# Patient Record
Sex: FEMALE | Race: WHITE | Employment: OTHER | URBAN - METROPOLITAN AREA
[De-identification: names, ages, dates, MRNs, and addresses within clinical notes are randomized per-mention and may not be internally consistent; named-entity substitution may affect disease eponyms.]

---

## 2019-08-17 ENCOUNTER — HOSPITAL ENCOUNTER (EMERGENCY)
Age: 39
Discharge: HOME | End: 2019-08-17
Attending: INTERNAL MEDICINE
Payer: COMMERCIAL

## 2019-08-17 VITALS
DIASTOLIC BLOOD PRESSURE: 67 MMHG | RESPIRATION RATE: 18 BRPM | SYSTOLIC BLOOD PRESSURE: 106 MMHG | HEART RATE: 112 BPM | TEMPERATURE: 98.9 F | BODY MASS INDEX: 31.01 KG/M2 | OXYGEN SATURATION: 96 % | WEIGHT: 175 LBS | HEIGHT: 63 IN

## 2019-08-17 DIAGNOSIS — D17.1 LIPOMA OF BACK: Primary | ICD-10-CM

## 2019-08-17 PROCEDURE — 6360000002 HC RX W HCPCS: Performed by: INTERNAL MEDICINE

## 2019-08-17 PROCEDURE — 99282 EMERGENCY DEPT VISIT SF MDM: CPT

## 2019-08-17 PROCEDURE — 96372 THER/PROPH/DIAG INJ SC/IM: CPT

## 2019-08-17 RX ORDER — GABAPENTIN 100 MG/1
200 CAPSULE ORAL 3 TIMES DAILY
COMMUNITY

## 2019-08-17 RX ORDER — PREDNISONE 20 MG/1
40 TABLET ORAL DAILY
Qty: 10 TABLET | Refills: 0 | Status: SHIPPED | OUTPATIENT
Start: 2019-08-17 | End: 2019-08-22

## 2019-08-17 RX ORDER — PANTOPRAZOLE SODIUM 40 MG/1
40 TABLET, DELAYED RELEASE ORAL DAILY
COMMUNITY

## 2019-08-17 RX ORDER — KETOROLAC TROMETHAMINE 10 MG/1
10 TABLET, FILM COATED ORAL EVERY 6 HOURS PRN
Qty: 20 TABLET | Refills: 0 | Status: SHIPPED | OUTPATIENT
Start: 2019-08-17

## 2019-08-17 RX ORDER — HYDROXYZINE 50 MG/1
50 TABLET, FILM COATED ORAL 3 TIMES DAILY PRN
COMMUNITY

## 2019-08-17 RX ORDER — METHYLPREDNISOLONE SODIUM SUCCINATE 125 MG/2ML
80 INJECTION, POWDER, LYOPHILIZED, FOR SOLUTION INTRAMUSCULAR; INTRAVENOUS ONCE
Status: COMPLETED | OUTPATIENT
Start: 2019-08-17 | End: 2019-08-17

## 2019-08-17 RX ORDER — VENLAFAXINE 37.5 MG/1
37.5 TABLET ORAL DAILY
COMMUNITY

## 2019-08-17 RX ORDER — ARIPIPRAZOLE 5 MG/1
5 TABLET ORAL DAILY
COMMUNITY

## 2019-08-17 RX ADMIN — METHYLPREDNISOLONE SODIUM SUCCINATE 80 MG: 125 INJECTION, POWDER, FOR SOLUTION INTRAMUSCULAR; INTRAVENOUS at 19:47

## 2019-08-17 ASSESSMENT — PAIN SCALES - GENERAL: PAINLEVEL_OUTOF10: 8

## 2019-08-17 ASSESSMENT — PAIN DESCRIPTION - PAIN TYPE: TYPE: ACUTE PAIN

## 2019-08-17 ASSESSMENT — PAIN DESCRIPTION - DIRECTION: RADIATING_TOWARDS: DOWN BOTH LEGS

## 2019-08-17 ASSESSMENT — PAIN DESCRIPTION - ONSET: ONSET: ON-GOING

## 2019-08-17 ASSESSMENT — PAIN DESCRIPTION - ORIENTATION: ORIENTATION: RIGHT;LEFT;UPPER;LOWER

## 2019-08-17 ASSESSMENT — PAIN DESCRIPTION - LOCATION: LOCATION: BACK

## 2019-08-17 ASSESSMENT — PAIN DESCRIPTION - PROGRESSION: CLINICAL_PROGRESSION: GRADUALLY WORSENING

## 2019-08-17 ASSESSMENT — PAIN DESCRIPTION - DESCRIPTORS: DESCRIPTORS: PRESSURE;STABBING

## 2019-08-17 ASSESSMENT — ACTIVITIES OF DAILY LIVING (ADL): EFFECT OF PAIN ON DAILY ACTIVITIES: NOT SLEEPING

## 2019-08-17 ASSESSMENT — PAIN DESCRIPTION - FREQUENCY: FREQUENCY: CONTINUOUS

## 2019-08-17 NOTE — ED PROVIDER NOTES
[address] [facility] ED  EMERGENCY DEPARTMENT ENCOUNTER      Pt Name: Nancy Walsh  MRN: [id number]  [place] 1980  Date of evaluation: 8/17/2019  Provider: Wendy Hahn MD    CHIEF COMPLAINT       Chief Complaint   Patient presents with    Back Pain     for 2 months. Pt said she has painful lumps on her back for 2 months that are getting worse. HISTORY OF PRESENT ILLNESS   (Location/Symptom, Timing/Onset, Context/Setting, Quality, Duration, Modifying Factors, Severity)  Note limiting factors. Nancy Walsh is a 37 y.o. female who presents to the emergency department for evaluation and management of 2 months of back pain due to \"lumps on her back\". She states that they are painful when she lies down and presses on them a certatin way. She has not seen any other providers for them. She denies fever and weight loss. She hs tried ibuprofen but it is not helping. [facility]    Nursing Notes were reviewed. REVIEW OF SYSTEMS    (2-9 systems for level 4, 10 or more for level 5)       REVIEW OF SYSTEMS    Constitutional: Negative for fatigue and fever. Musculoskeletal: Positive for painful masses on her back, Negative for arthralgias, back pain and neck pain. Skin: Negative for color change, pallor, rash and wound. All other systems reviewed and are negative. Except as noted above theremainder of the review of systems was reviewed and negative. PASTMEDICAL HISTORY     Past Medical History:   Diagnosis Date    Anxiety     Depression          SURGICAL HISTORY     No past surgical history on file. CURRENT MEDICATIONS       Discharge Medication List as of 8/17/2019  7:51 PM      CONTINUE these medications which have NOT CHANGED    Details   gabapentin (NEURONTIN) 100 MG capsule Take 200 mg by mouth 3 times daily. Historical Med      ARIPiprazole (ABILIFY) 5 MG tablet Take 5 mg by mouth dailyHistorical Med      venlafaxine (EFFEXOR) 37.5 MG tablet Take 37.5 mg by mouth dehydration or loss of function. Patient Course:        ED Medicationsadministered this visit:    Medications   methylPREDNISolone sodium (SOLU-MEDROL) injection 80 mg (80 mg Intramuscular Given 8/17/19 1947)       New Prescriptions from this visit:    Discharge Medication List as of 8/17/2019  7:51 PM      START taking these medications    Details   predniSONE (DELTASONE) 20 MG tablet Take 2 tablets by mouth daily for 5 days, Disp-10 tablet, R-0Print      ketorolac (TORADOL) 10 MG tablet Take 1 tablet by mouth every 6 hours as needed for Pain Do not take with any other NSAIDs including ibuprofen, Motrin, Advil, Aleve, Naprosyn., Disp-20 tablet, R-0Print             Follow-up:  [facility] ED  [address] [facility] [address] [phone]    As needed, If symptoms worsen    [place] [address] [employer] [facility] [address]  Schedule an appointment as soon as possible for a visit in 3 days          Final Impression:   1. Lipoma of back Worsening              (Please note that portions of this note werecompleted with a voice recognition program.  Efforts were made to edit the dictations but occasionally words are mis-transcribed.)    FINAL IMPRESSION      1.  Lipoma of back Worsening         DISPOSITION/PLAN   DISPOSITION Decision To Discharge 08/17/2019 07:36:57 PM      PATIENT REFERRED TO:  [facility] ED  [address] [facility] [address]  [phone]    As needed, If symptoms worsen    [address] [facility] [phone]  Schedule an appointment as soon as possible for a visit in 3 days        DISCHARGE MEDICATIONS:  Discharge Medication List as of 8/17/2019  7:51 PM      START taking these medications    Details   predniSONE (DELTASONE) 20 MG tablet Take 2 tablets by mouth daily for 5 days, Disp-10 tablet, R-0Print      ketorolac (TORADOL) 10 MG tablet Take 1 tablet by mouth every 6 hours as needed for

## 2019-08-18 NOTE — PROGRESS NOTES
Discharge instructions reviewed with pt. Pt confirmed understanding with no further questions asked. Scripts given to pt. No further complaints voiced.
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
## 2023-10-11 ENCOUNTER — OFFICE VISIT (OUTPATIENT)
Dept: PAIN MEDICINE | Facility: CLINIC | Age: 43
End: 2023-10-11
Payer: COMMERCIAL

## 2023-10-11 VITALS
SYSTOLIC BLOOD PRESSURE: 118 MMHG | HEIGHT: 63 IN | DIASTOLIC BLOOD PRESSURE: 76 MMHG | HEART RATE: 92 BPM | BODY MASS INDEX: 37.57 KG/M2 | RESPIRATION RATE: 16 BRPM | WEIGHT: 212.07 LBS

## 2023-10-11 DIAGNOSIS — G89.29 CHRONIC BILATERAL LOW BACK PAIN WITHOUT SCIATICA: ICD-10-CM

## 2023-10-11 DIAGNOSIS — M54.50 CHRONIC BILATERAL LOW BACK PAIN WITHOUT SCIATICA: ICD-10-CM

## 2023-10-11 DIAGNOSIS — M70.61 GREATER TROCHANTERIC BURSITIS OF BOTH HIPS: ICD-10-CM

## 2023-10-11 DIAGNOSIS — M79.18 MYOFASCIAL PAIN: Primary | ICD-10-CM

## 2023-10-11 DIAGNOSIS — M70.62 GREATER TROCHANTERIC BURSITIS OF BOTH HIPS: ICD-10-CM

## 2023-10-11 PROCEDURE — 99214 OFFICE O/P EST MOD 30 MIN: CPT | Performed by: STUDENT IN AN ORGANIZED HEALTH CARE EDUCATION/TRAINING PROGRAM

## 2023-10-11 RX ORDER — GABAPENTIN 600 MG/1
600 TABLET ORAL 3 TIMES DAILY
COMMUNITY

## 2023-10-11 RX ORDER — TRAMADOL HYDROCHLORIDE 50 MG/1
50 TABLET ORAL 3 TIMES DAILY
COMMUNITY

## 2023-10-11 RX ORDER — LEVOTHYROXINE SODIUM 50 UG/1
50 TABLET ORAL
COMMUNITY

## 2023-10-11 RX ORDER — METAXALONE 800 MG/1
800 TABLET ORAL 3 TIMES DAILY
Qty: 90 TABLET | Refills: 1 | Status: SHIPPED | OUTPATIENT
Start: 2023-10-11 | End: 2023-10-30

## 2023-10-11 RX ORDER — METHOCARBAMOL 500 MG/1
500 TABLET, FILM COATED ORAL
COMMUNITY
End: 2023-10-11

## 2023-10-11 RX ORDER — METOPROLOL TARTRATE 50 MG/1
50 TABLET ORAL
COMMUNITY

## 2023-10-11 ASSESSMENT — PAIN - FUNCTIONAL ASSESSMENT: PAIN_FUNCTIONAL_ASSESSMENT: 0-10

## 2023-10-11 ASSESSMENT — PAIN SCALES - GENERAL
PAINLEVEL_OUTOF10: 8
PAINLEVEL: 8

## 2023-10-27 PROBLEM — F51.01 PRIMARY INSOMNIA: Status: ACTIVE | Noted: 2023-10-27

## 2023-10-27 PROBLEM — R06.00 DYSPNEA: Status: ACTIVE | Noted: 2023-10-27

## 2023-10-27 PROBLEM — M54.30 SCIATICA: Status: ACTIVE | Noted: 2023-10-27

## 2023-10-27 PROBLEM — D64.9 ANEMIA: Status: ACTIVE | Noted: 2023-10-27

## 2023-10-27 PROBLEM — M19.90 ARTHRITIS: Status: ACTIVE | Noted: 2023-10-27

## 2023-10-27 PROBLEM — N93.9 ABNORMAL VAGINAL BLEEDING: Status: ACTIVE | Noted: 2023-10-27

## 2023-10-27 PROBLEM — E53.9 VITAMIN B-COMPLEX DEFICIENCY: Status: ACTIVE | Noted: 2023-10-27

## 2023-10-27 PROBLEM — R92.8 ABNORMAL MAMMOGRAM: Status: ACTIVE | Noted: 2023-10-27

## 2023-10-27 PROBLEM — M79.7 FIBROMYALGIA: Status: ACTIVE | Noted: 2023-10-27

## 2023-10-27 PROBLEM — R20.2 NUMBNESS AND TINGLING: Status: ACTIVE | Noted: 2023-10-27

## 2023-10-27 PROBLEM — E78.00 HYPERCHOLESTEROLEMIA: Status: ACTIVE | Noted: 2023-10-27

## 2023-10-27 PROBLEM — R20.0 NUMBNESS AND TINGLING: Status: ACTIVE | Noted: 2023-10-27

## 2023-10-27 PROBLEM — C53.9 CERVICAL CANCER (MULTI): Status: ACTIVE | Noted: 2023-10-27

## 2023-10-27 PROBLEM — R49.9 VOICE DISTURBANCE: Status: ACTIVE | Noted: 2023-10-27

## 2023-10-27 PROBLEM — E03.9 HYPOTHYROIDISM: Status: ACTIVE | Noted: 2023-10-27

## 2023-10-27 PROBLEM — E78.2 MIXED HYPERLIPIDEMIA: Status: ACTIVE | Noted: 2023-10-27

## 2023-10-27 PROBLEM — K21.9 GASTROESOPHAGEAL REFLUX DISEASE: Status: ACTIVE | Noted: 2023-10-27

## 2023-10-27 PROBLEM — R31.0 CLOT HEMATURIA: Status: ACTIVE | Noted: 2023-10-27

## 2023-10-27 PROBLEM — K21.9 GASTRO-ESOPHAGEAL REFLUX DISEASE WITHOUT ESOPHAGITIS: Status: ACTIVE | Noted: 2023-10-27

## 2023-10-27 PROBLEM — E83.42 HYPOMAGNESEMIA: Status: ACTIVE | Noted: 2023-10-27

## 2023-10-27 PROBLEM — R29.810 FACIAL WEAKNESS: Status: ACTIVE | Noted: 2023-10-27

## 2023-10-27 PROBLEM — G89.4 CHRONIC PAIN SYNDROME: Status: ACTIVE | Noted: 2023-10-27

## 2023-10-27 PROBLEM — R21 RASH: Status: ACTIVE | Noted: 2023-10-27

## 2023-10-27 PROBLEM — M76.30 IT BAND SYNDROME: Status: ACTIVE | Noted: 2023-10-27

## 2023-10-27 PROBLEM — R91.1 SOLITARY PULMONARY NODULE: Status: ACTIVE | Noted: 2023-10-27

## 2023-10-27 PROBLEM — K58.9 IBS (IRRITABLE BOWEL SYNDROME): Status: ACTIVE | Noted: 2023-10-27

## 2023-10-27 PROBLEM — M62.838 OTHER MUSCLE SPASM: Status: ACTIVE | Noted: 2023-10-27

## 2023-10-27 PROBLEM — F41.8 MIXED ANXIETY DEPRESSIVE DISORDER: Status: ACTIVE | Noted: 2023-10-27

## 2023-10-27 PROBLEM — M51.9 LUMBAR DISC DISEASE: Status: ACTIVE | Noted: 2023-10-27

## 2023-10-27 PROBLEM — J98.01 BRONCHOSPASM: Status: ACTIVE | Noted: 2023-10-27

## 2023-10-27 PROBLEM — E53.8 VITAMIN B 12 DEFICIENCY: Status: ACTIVE | Noted: 2023-10-27

## 2023-10-27 PROBLEM — F41.9 ANXIETY: Status: ACTIVE | Noted: 2023-10-27

## 2023-10-27 PROBLEM — Z85.41 HISTORY OF CERVICAL CANCER: Status: ACTIVE | Noted: 2023-10-27

## 2023-10-27 PROBLEM — G93.32 CHRONIC FATIGUE SYNDROME: Status: ACTIVE | Noted: 2023-10-27

## 2023-10-27 PROBLEM — F90.9 ADHD (ATTENTION DEFICIT HYPERACTIVITY DISORDER): Status: ACTIVE | Noted: 2023-10-27

## 2023-10-27 PROBLEM — G62.9 PERIPHERAL NEUROPATHY: Status: ACTIVE | Noted: 2023-10-27

## 2023-10-27 PROBLEM — E78.5 DYSLIPIDEMIA: Status: ACTIVE | Noted: 2023-10-27

## 2023-10-27 PROBLEM — F31.9 BIPOLAR DISORDER (MULTI): Status: ACTIVE | Noted: 2023-10-27

## 2023-10-27 PROBLEM — M62.838 NECK MUSCLE SPASM: Status: ACTIVE | Noted: 2023-10-27

## 2023-10-27 PROBLEM — G43.909 MIGRAINE: Status: ACTIVE | Noted: 2023-10-27

## 2023-10-27 PROBLEM — G89.29 OTHER CHRONIC PAIN: Status: ACTIVE | Noted: 2023-10-27

## 2023-10-27 PROBLEM — B37.0 ORAL CANDIDIASIS: Status: ACTIVE | Noted: 2023-10-27

## 2023-10-27 PROBLEM — E55.9 VITAMIN D DEFICIENCY: Status: ACTIVE | Noted: 2023-10-27

## 2023-10-27 RX ORDER — FENOFIBRATE 134 MG/1
1 CAPSULE ORAL DAILY
COMMUNITY
Start: 2019-07-12 | End: 2023-10-30 | Stop reason: SDUPTHER

## 2023-10-27 RX ORDER — HYDROXYZINE HYDROCHLORIDE 50 MG/1
1 TABLET, FILM COATED ORAL NIGHTLY PRN
COMMUNITY
Start: 2019-07-12

## 2023-10-27 RX ORDER — PEN NEEDLE, DIABETIC 31 GX5/16"
NEEDLE, DISPOSABLE MISCELLANEOUS
COMMUNITY
Start: 2023-07-16

## 2023-10-27 RX ORDER — METOPROLOL SUCCINATE 50 MG/1
1 TABLET, EXTENDED RELEASE ORAL DAILY
COMMUNITY
End: 2023-10-30 | Stop reason: SDUPTHER

## 2023-10-27 RX ORDER — PREGABALIN 300 MG/1
CAPSULE ORAL
COMMUNITY
End: 2024-01-11 | Stop reason: WASHOUT

## 2023-10-27 RX ORDER — LANOLIN ALCOHOL/MO/W.PET/CERES
2 CREAM (GRAM) TOPICAL DAILY
COMMUNITY
End: 2023-10-30

## 2023-10-27 RX ORDER — DESVENLAFAXINE 100 MG/1
100 TABLET, EXTENDED RELEASE ORAL DAILY
COMMUNITY
End: 2023-10-30

## 2023-10-27 RX ORDER — LANCETS 33 GAUGE
EACH MISCELLANEOUS
COMMUNITY
Start: 2023-02-27 | End: 2023-10-30 | Stop reason: SDUPTHER

## 2023-10-27 RX ORDER — ALBUTEROL SULFATE 90 UG/1
2 AEROSOL, METERED RESPIRATORY (INHALATION) EVERY 6 HOURS
COMMUNITY
Start: 2023-10-02

## 2023-10-27 RX ORDER — CELECOXIB 100 MG/1
CAPSULE ORAL
COMMUNITY
End: 2023-10-30

## 2023-10-27 RX ORDER — CHOLECALCIFEROL (VITAMIN D3) 125 MCG
1 CAPSULE ORAL DAILY
COMMUNITY
Start: 2023-07-30 | End: 2023-10-30

## 2023-10-27 RX ORDER — IBUPROFEN 800 MG/1
TABLET ORAL
COMMUNITY
End: 2023-10-30

## 2023-10-27 RX ORDER — METOPROLOL SUCCINATE 25 MG/1
1 TABLET, EXTENDED RELEASE ORAL DAILY
COMMUNITY
Start: 2023-01-31

## 2023-10-27 RX ORDER — PROMETHAZINE HYDROCHLORIDE 25 MG/1
TABLET ORAL
COMMUNITY
End: 2023-10-30

## 2023-10-27 RX ORDER — LEVOTHYROXINE SODIUM 25 UG/1
25 CAPSULE ORAL
COMMUNITY
End: 2023-10-30

## 2023-10-27 RX ORDER — MIRTAZAPINE 15 MG/1
1 TABLET, FILM COATED ORAL DAILY
COMMUNITY
Start: 2019-07-12 | End: 2023-10-30

## 2023-10-27 RX ORDER — INSULIN ASPART 100 [IU]/ML
25 INJECTION, SOLUTION INTRAVENOUS; SUBCUTANEOUS 2 TIMES DAILY
COMMUNITY

## 2023-10-27 RX ORDER — HYDROCODONE BITARTRATE AND ACETAMINOPHEN 5; 325 MG/1; MG/1
TABLET ORAL
COMMUNITY
End: 2023-10-30 | Stop reason: ALTCHOICE

## 2023-10-27 RX ORDER — GLIMEPIRIDE 4 MG/1
1 TABLET ORAL 2 TIMES DAILY
COMMUNITY

## 2023-10-27 RX ORDER — INSULIN ASPART 100 [IU]/ML
50 INJECTION, SUSPENSION SUBCUTANEOUS 2 TIMES DAILY
COMMUNITY
Start: 2023-09-28

## 2023-10-27 RX ORDER — VENLAFAXINE HYDROCHLORIDE 150 MG/1
1 CAPSULE, EXTENDED RELEASE ORAL DAILY
COMMUNITY
End: 2023-10-30

## 2023-10-27 RX ORDER — LEVOFLOXACIN 500 MG/1
TABLET, FILM COATED ORAL
COMMUNITY
End: 2023-10-30 | Stop reason: ALTCHOICE

## 2023-10-27 RX ORDER — CLOTRIMAZOLE 10 MG/1
LOZENGE ORAL; TOPICAL 4 TIMES DAILY
COMMUNITY
Start: 2019-07-19 | End: 2023-10-30 | Stop reason: ALTCHOICE

## 2023-10-27 RX ORDER — PHENAZOPYRIDINE HYDROCHLORIDE 200 MG/1
1 TABLET, FILM COATED ORAL
COMMUNITY
Start: 2019-07-12 | End: 2023-10-30

## 2023-10-27 RX ORDER — BUSPIRONE HYDROCHLORIDE 10 MG/1
TABLET ORAL
COMMUNITY
End: 2023-10-30

## 2023-10-27 RX ORDER — GABAPENTIN 100 MG/1
2 CAPSULE ORAL 3 TIMES DAILY
COMMUNITY
Start: 2019-01-21 | End: 2023-10-30 | Stop reason: SDUPTHER

## 2023-10-27 RX ORDER — ONDANSETRON 4 MG/1
1 TABLET, ORALLY DISINTEGRATING ORAL DAILY
COMMUNITY
End: 2023-10-30 | Stop reason: SDUPTHER

## 2023-10-27 RX ORDER — ATORVASTATIN CALCIUM 80 MG/1
1 TABLET, FILM COATED ORAL NIGHTLY
COMMUNITY
Start: 2023-09-29 | End: 2023-10-30 | Stop reason: SDUPTHER

## 2023-10-27 RX ORDER — DICLOFENAC SODIUM 75 MG/1
TABLET, DELAYED RELEASE ORAL
COMMUNITY
End: 2023-10-30

## 2023-10-27 RX ORDER — TRIAMTERENE AND HYDROCHLOROTHIAZIDE 37.5; 25 MG/1; MG/1
CAPSULE ORAL
COMMUNITY
End: 2023-10-30

## 2023-10-27 RX ORDER — OXAPROZIN 600 MG/1
TABLET, FILM COATED ORAL
COMMUNITY
End: 2023-10-30

## 2023-10-27 RX ORDER — ESTRADIOL 1 MG/1
TABLET ORAL
COMMUNITY

## 2023-10-27 RX ORDER — BENZONATATE 100 MG/1
1 CAPSULE ORAL 3 TIMES DAILY PRN
COMMUNITY
Start: 2023-04-28 | End: 2023-10-30 | Stop reason: ALTCHOICE

## 2023-10-27 RX ORDER — ACETAMINOPHEN AND CODEINE PHOSPHATE 300; 30 MG/1; MG/1
TABLET ORAL
COMMUNITY
End: 2023-10-30

## 2023-10-27 RX ORDER — SUCRALFATE 1 G/10ML
10 SUSPENSION ORAL 4 TIMES DAILY
COMMUNITY
Start: 2023-08-04 | End: 2023-10-30 | Stop reason: SDUPTHER

## 2023-10-27 RX ORDER — LISDEXAMFETAMINE DIMESYLATE 70 MG/1
CAPSULE ORAL
COMMUNITY
End: 2023-10-30

## 2023-10-27 RX ORDER — BACLOFEN 10 MG/1
TABLET ORAL
COMMUNITY
End: 2023-10-30

## 2023-10-27 RX ORDER — ERGOCALCIFEROL 1.25 MG/1
CAPSULE ORAL
COMMUNITY
End: 2023-10-30 | Stop reason: SDUPTHER

## 2023-10-27 RX ORDER — DEXTROMETHORPHAN HYDROBROMIDE AND QUINIDINE SULFATE 20; 10 MG/1; MG/1
CAPSULE, GELATIN COATED ORAL
COMMUNITY
End: 2023-10-30

## 2023-10-27 RX ORDER — METRONIDAZOLE 500 MG/1
TABLET ORAL
COMMUNITY

## 2023-10-27 RX ORDER — ARIPIPRAZOLE 5 MG/1
1 TABLET ORAL DAILY
COMMUNITY
Start: 2019-07-12 | End: 2023-10-30

## 2023-10-27 RX ORDER — DICYCLOMINE HYDROCHLORIDE 20 MG/1
TABLET ORAL
COMMUNITY
End: 2023-10-30

## 2023-10-27 RX ORDER — METOCLOPRAMIDE 10 MG/1
TABLET ORAL
COMMUNITY
End: 2023-10-30 | Stop reason: ALTCHOICE

## 2023-10-27 RX ORDER — METHOCARBAMOL 500 MG/1
TABLET, FILM COATED ORAL
COMMUNITY
End: 2023-10-30 | Stop reason: SDUPTHER

## 2023-10-27 RX ORDER — GLIMEPIRIDE 2 MG/1
2 TABLET ORAL
COMMUNITY
End: 2023-10-30

## 2023-10-27 RX ORDER — PANTOPRAZOLE SODIUM 20 MG/1
TABLET, DELAYED RELEASE ORAL
COMMUNITY

## 2023-10-27 RX ORDER — HYDROCODONE BITARTRATE AND ACETAMINOPHEN 7.5; 325 MG/1; MG/1
TABLET ORAL
COMMUNITY
End: 2024-01-11 | Stop reason: WASHOUT

## 2023-10-27 RX ORDER — DIAZEPAM 10 MG/1
TABLET ORAL
COMMUNITY
End: 2023-10-30 | Stop reason: ALTCHOICE

## 2023-10-27 RX ORDER — PREGABALIN 150 MG/1
CAPSULE ORAL
COMMUNITY
End: 2023-10-30

## 2023-10-27 RX ORDER — MUPIROCIN 20 MG/G
OINTMENT TOPICAL
COMMUNITY
Start: 2019-11-20 | End: 2023-10-30 | Stop reason: ALTCHOICE

## 2023-10-27 RX ORDER — MELOXICAM 15 MG/1
TABLET ORAL
COMMUNITY
End: 2023-10-30

## 2023-10-27 RX ORDER — LURASIDONE HYDROCHLORIDE 60 MG/1
TABLET, FILM COATED ORAL
COMMUNITY
End: 2023-10-30

## 2023-10-27 RX ORDER — TENAPANOR HYDROCHLORIDE 53.2 MG/1
1 TABLET ORAL 2 TIMES DAILY
COMMUNITY
Start: 2023-08-29 | End: 2023-10-30

## 2023-10-27 RX ORDER — CIPROFLOXACIN 500 MG/1
1 TABLET ORAL 2 TIMES DAILY
COMMUNITY
Start: 2019-07-09 | End: 2023-10-30 | Stop reason: ALTCHOICE

## 2023-10-27 RX ORDER — CLONAZEPAM 0.5 MG/1
TABLET ORAL
COMMUNITY
End: 2023-10-30

## 2023-10-27 RX ORDER — VENLAFAXINE 75 MG/1
1 TABLET ORAL DAILY
COMMUNITY
Start: 2019-07-12 | End: 2023-10-30

## 2023-10-27 RX ORDER — CYANOCOBALAMIN (VITAMIN B-12) 500 MCG
TABLET ORAL EVERY 24 HOURS
COMMUNITY
End: 2023-10-30 | Stop reason: SDUPTHER

## 2023-10-27 RX ORDER — BLOOD SUGAR DIAGNOSTIC
STRIP MISCELLANEOUS
COMMUNITY
Start: 2023-09-28

## 2023-10-27 RX ORDER — HYDROXYZINE PAMOATE 50 MG/1
50 CAPSULE ORAL 3 TIMES DAILY PRN
COMMUNITY
End: 2023-10-30 | Stop reason: SDUPTHER

## 2023-10-27 RX ORDER — METHOCARBAMOL 750 MG/1
1 TABLET, FILM COATED ORAL 3 TIMES DAILY
COMMUNITY

## 2023-10-27 RX ORDER — PROPRANOLOL HYDROCHLORIDE 20 MG/1
1 TABLET ORAL EVERY 12 HOURS
COMMUNITY

## 2023-10-27 RX ORDER — LURASIDONE HYDROCHLORIDE 40 MG/1
TABLET, FILM COATED ORAL
COMMUNITY
End: 2023-10-30

## 2023-10-27 RX ORDER — SUCRALFATE 1 G/1
1 TABLET ORAL 3 TIMES DAILY
COMMUNITY
Start: 2023-09-28

## 2023-10-27 RX ORDER — ARIPIPRAZOLE 15 MG/1
TABLET ORAL
COMMUNITY
End: 2023-10-30 | Stop reason: ALTCHOICE

## 2023-10-27 RX ORDER — ZOLPIDEM TARTRATE 10 MG/1
TABLET ORAL
COMMUNITY
Start: 2019-03-12

## 2023-10-27 RX ORDER — ACETAMINOPHEN 500 MG
TABLET ORAL
COMMUNITY
End: 2023-10-30

## 2023-10-27 RX ORDER — FLUCONAZOLE 150 MG/1
TABLET ORAL
COMMUNITY
End: 2023-10-30 | Stop reason: ALTCHOICE

## 2023-10-27 RX ORDER — SULFAMETHOXAZOLE AND TRIMETHOPRIM 800; 160 MG/1; MG/1
TABLET ORAL
COMMUNITY
End: 2023-10-30 | Stop reason: ALTCHOICE

## 2023-10-27 RX ORDER — TIZANIDINE 4 MG/1
TABLET ORAL
COMMUNITY
End: 2023-10-30 | Stop reason: ALTCHOICE

## 2023-10-27 RX ORDER — ALPRAZOLAM 0.25 MG/1
TABLET ORAL
COMMUNITY
End: 2023-10-30

## 2023-10-27 RX ORDER — PREDNISONE 20 MG/1
TABLET ORAL
COMMUNITY
End: 2023-10-30 | Stop reason: ALTCHOICE

## 2023-10-27 RX ORDER — ONDANSETRON 4 MG/1
1 TABLET, FILM COATED ORAL EVERY 8 HOURS
COMMUNITY

## 2023-10-27 RX ORDER — PALIPERIDONE 9 MG/1
9 TABLET, EXTENDED RELEASE ORAL DAILY
COMMUNITY

## 2023-10-27 RX ORDER — MIRTAZAPINE 45 MG/1
TABLET, FILM COATED ORAL
COMMUNITY
End: 2023-10-30

## 2023-10-27 RX ORDER — HYDROCODONE BITARTRATE AND HOMATROPINE METHYLBROMIDE ORAL SOLUTION 5; 1.5 MG/5ML; MG/5ML
5 LIQUID ORAL 3 TIMES DAILY
COMMUNITY
Start: 2023-09-27 | End: 2023-10-30

## 2023-10-27 RX ORDER — DICYCLOMINE HYDROCHLORIDE 10 MG/1
1 CAPSULE ORAL 3 TIMES DAILY
COMMUNITY
Start: 2019-07-12 | End: 2023-10-30

## 2023-10-27 RX ORDER — FENOFIBRATE 145 MG/1
1 TABLET, FILM COATED ORAL DAILY
COMMUNITY
Start: 2023-08-31

## 2023-10-27 RX ORDER — CICLOPIROX OLAMINE 7.7 MG/G
1 CREAM TOPICAL 2 TIMES DAILY
COMMUNITY
Start: 2023-01-31

## 2023-10-27 RX ORDER — ATORVASTATIN CALCIUM 40 MG/1
TABLET, FILM COATED ORAL
COMMUNITY
Start: 2019-04-09

## 2023-10-27 RX ORDER — PANTOPRAZOLE SODIUM 40 MG/1
TABLET, DELAYED RELEASE ORAL
COMMUNITY
Start: 2018-11-30 | End: 2023-10-30 | Stop reason: SDUPTHER

## 2023-10-27 RX ORDER — BUTALBITAL, ACETAMINOPHEN AND CAFFEINE 50; 325; 40 MG/1; MG/1; MG/1
CAPSULE ORAL
COMMUNITY
End: 2023-10-30

## 2023-10-27 RX ORDER — LORATADINE 10 MG/1
TABLET ORAL
COMMUNITY
End: 2023-10-30

## 2023-10-27 RX ORDER — NAPROXEN 500 MG/1
500 TABLET ORAL
COMMUNITY
End: 2023-10-30

## 2023-10-27 RX ORDER — CLONIDINE HYDROCHLORIDE 0.1 MG/1
0.1 TABLET ORAL DAILY
COMMUNITY
End: 2023-10-30

## 2023-10-27 RX ORDER — OXYCODONE AND ACETAMINOPHEN 5; 325 MG/1; MG/1
TABLET ORAL
COMMUNITY
End: 2023-10-30 | Stop reason: ALTCHOICE

## 2023-10-27 RX ORDER — SITAGLIPTIN 100 MG/1
1 TABLET, FILM COATED ORAL DAILY
COMMUNITY

## 2023-10-27 RX ORDER — ACETAMINOPHEN 500 MG
TABLET ORAL
COMMUNITY

## 2023-10-30 ENCOUNTER — HOSPITAL ENCOUNTER (OUTPATIENT)
Dept: RADIOLOGY | Facility: HOSPITAL | Age: 43
Discharge: HOME | End: 2023-10-30
Payer: COMMERCIAL

## 2023-10-30 ENCOUNTER — HOSPITAL ENCOUNTER (OUTPATIENT)
Dept: GASTROENTEROLOGY | Facility: HOSPITAL | Age: 43
Discharge: HOME | End: 2023-10-30
Payer: COMMERCIAL

## 2023-10-30 VITALS
WEIGHT: 210 LBS | HEART RATE: 80 BPM | DIASTOLIC BLOOD PRESSURE: 78 MMHG | OXYGEN SATURATION: 94 % | RESPIRATION RATE: 18 BRPM | BODY MASS INDEX: 37.21 KG/M2 | SYSTOLIC BLOOD PRESSURE: 124 MMHG | TEMPERATURE: 97.2 F | HEIGHT: 63 IN

## 2023-10-30 LAB — GLUCOSE BLD MANUAL STRIP-MCNC: 171 MG/DL (ref 74–99)

## 2023-10-30 PROCEDURE — 20610 DRAIN/INJ JOINT/BURSA W/O US: CPT | Mod: 50 | Performed by: STUDENT IN AN ORGANIZED HEALTH CARE EDUCATION/TRAINING PROGRAM

## 2023-10-30 PROCEDURE — 76000 FLUOROSCOPY <1 HR PHYS/QHP: CPT

## 2023-10-30 PROCEDURE — 94760 N-INVAS EAR/PLS OXIMETRY 1: CPT

## 2023-10-30 PROCEDURE — 82947 ASSAY GLUCOSE BLOOD QUANT: CPT

## 2023-10-30 PROCEDURE — 20610 DRAIN/INJ JOINT/BURSA W/O US: CPT | Performed by: STUDENT IN AN ORGANIZED HEALTH CARE EDUCATION/TRAINING PROGRAM

## 2023-10-30 ASSESSMENT — PAIN SCALES - GENERAL
PAINLEVEL_OUTOF10: 7
PAINLEVEL_OUTOF10: 8

## 2023-10-30 ASSESSMENT — COLUMBIA-SUICIDE SEVERITY RATING SCALE - C-SSRS
1. IN THE PAST MONTH, HAVE YOU WISHED YOU WERE DEAD OR WISHED YOU COULD GO TO SLEEP AND NOT WAKE UP?: NO
6. HAVE YOU EVER DONE ANYTHING, STARTED TO DO ANYTHING, OR PREPARED TO DO ANYTHING TO END YOUR LIFE?: NO
2. HAVE YOU ACTUALLY HAD ANY THOUGHTS OF KILLING YOURSELF?: NO

## 2023-10-30 ASSESSMENT — PAIN DESCRIPTION - DESCRIPTORS: DESCRIPTORS: SHARP;ACHING;NUMBNESS;TINGLING

## 2023-10-30 ASSESSMENT — PAIN - FUNCTIONAL ASSESSMENT
PAIN_FUNCTIONAL_ASSESSMENT: 0-10
PAIN_FUNCTIONAL_ASSESSMENT: 0-10

## 2023-10-30 NOTE — OP NOTE
"Procedure Note: 10/30/2023    PROCEDURE NAME: Bilateral greater trochanteric bursa injection    Patient Information: Henrique Delcid, MRN: [id number], : 1980  Chief Complaint: Bilateral lateral hip pain    Pain Diagnosis: The diagnosis of greater trochanteric bursitis has been made given the patient's clinical evaluation at prior evaluation.    Vitals:Pulse 86   Temp 36.2 °C (97.2 °F) (Temporal)   Resp 16   Ht 1.6 m (5' 3\")   Wt 95.3 kg (210 lb)   SpO2 93%   BMI 37.20 kg/m²     DESCRIPTION OF PROCEDURE:    Henrique Delcid was brought to the procedure room. The assistant obtained vital signs, which were found to be stable. She was then informed of the procedure, its benefits, and its risks, and her questions were answered regarding the procedure. Written informed consent was obtained from the patient. Immediately prior to starting the procedure, a time-out safety check was conducted and the patient's identification, procedure name, and procedure site were confirmed with the patient.    Bilateral Greater Trochanteric Bursa Injection  After informed written consent was obtained, the patient was placed in the left lateral decubitus position on the fluoroscopy table. Monitoring of pulse oximetry, heart rate, and blood pressure was done pre-procedure, and post-procedure. During the procedure the patient was monitored with continuous pulse-ox. A time out was performed before the beginning of the procedure. The correct site and laterality were marked. The right hip was prepped and draped in sterile fashion using ChloraPrep and and allowed to dry for 3 minutes.     AP and lateral views were used to identify the right hip under fluoroscopy and the area overlying the great trochanter. A marker was used to identify the skin entry site just lateral to the greater trochanter. The skin and subcutaneous tissue was anesthetized using 3 mL of 1% plain lidocaine with 1.5-inch 25-gauge needle at this site. A 3.5-inch 25-gauge " spinal needle was slowly advanced towards the upper border of the greater trochanter. At a position with the tip of the needle just lateral to the trochanter, negative aspiration was confirmed and 0.5 mL of Omnipaque contrast dye was injected. A lenticular outline of contrast on fluoroscopy indicated proximity to the greater trochanteric bursa. A total volume of 3 ml of a mixture of 60 mg of triamcinolone (40 mg/mL) diluted in 4.5 mL of bupivacaine 0.25% was then injected. The needle was then removed    The patient was then turned to the right lateral decubitus position and the same procedure was performed on the left hip in a similar fashion.      Anesthesia: local anesthesia   Complications: none      Jasiel Mahajan MD

## 2023-10-30 NOTE — DISCHARGE INSTRUCTIONS
DISCHARGE INSTRUCTIONS FOR INJECTIONS     You underwent a bilateral greater trochanteric bursa injection today    Aftermost injections, it is recommended that you relax and limit your activity for the remainder of the day unless you have been told otherwise by your pain physician.  You should not drive a car, operate machinery, or make important legal decisions unless otherwise directed by your pain physician.  You may resume your normal activity, including exercise, tomorrow.      Keep a written pain diary of how much pain relief you experienced following the injection procedure and the length of time of pain relief you experienced pain relief. Following diagnostic injections like medial branch nerve blocks, sacroiliac joint blocks, stellate ganglion injections and other blocks, it is very important you record the specific amount of pain relief you experienced immediately after the injectionand how long it lasted. Your doctor will ask you for this information at your follow up visit.     For all injections, please keep the injection site dry and inspect the site for a couple of days. You may remove the Band-Aid the day of the injection at any time.     Some discomfort, bruising or slight swelling may occur at the injection site. This is not abnormal if it occurs.  If needed you may:    -Take over the counter medication such as Tylenol or Motrin.   -Apply an ice pack for 30 minutes, 2 to 3 times a day for the first 24 hours.     You may shower today; no soaking baths, hot tubs, whirlpools or swimming pools for two days.      If you are given steroids in your injection, it may take 3-5 days for the steroid medication to take effect. You may notice a worsening of your symptoms for 1-2 days after the injection. This is not abnormal.  You may use acetaminophen, ibuprofen, or prescription medication that your doctor may have prescribed for you if you need to do so.     A few common side effects of steroids include facial  flushing, sweating, restlessness, irritability,difficulty sleeping, increase in blood sugar, and increased blood pressure. If you have diabetes, please monitor your blood sugar at least once a day for at least 5 days. If you have poorly controlled high blood pressure, monitoryour blood pressure for at least 2 days and contact your primary care physician if these numbers are unusually high for you.      If you take aspirin or non-steroidal anti-inflammatory drugs (examples are Motrin, Advil, ibuprofen, Naprosyn, Voltaren, Relafen, etc.) you may restart these this evening, but stop taking it 3 days before your next appointment, unless instructed otherwiseby your physician.      You do not need to discontinue non-aspirin-containing pain medications prior to an injection (examples: Celebrex, tramadol, hydrocodone and acetaminophen).      If you take a blood thinning medication (Coumadin, Lovenox, Fragmin,Ticlid, Plavix, Pradaxa, etc.), please discuss this with your primary care physician/cardiologist and your pain physician. These medications MUST be discontinued before you can have an injection safely, without the risk of uncontrolled bleeding. If these medications are not discontinued for an appropriate period of time, you will not be able to receivean injection.      If you are taking Coumadin, please have your INR checked the morning of your procedure and bringthe result to your appointment unless otherwise instructed. If your INR is over 1.2, your injection may need to be rescheduled to avoid uncontrolled bleeding from the needle placement.     Call [facility] at [phone] between 8am-4pm Monday - Friday if you are experiencing the following:    If you received an epidural or spinal injection:    -Headache that doesnot go away with medicine, is worse when sitting or standing up, and is greatly relieved upon lying down.   -Severe pain worse than or different than your baseline pain.   -Chills  or fever (101º F or greater).   -Drainage or signs of infection at the injection site     Go directly to the Emergency Department if you are experiencing the following and received an epidural or spinal injection:   -Abrupt weakness or progressive weakness in your legs that starts after you leave the clinic.   -Abrupt severe or worsening numbness in your legs.   -Inability to urinate after the injection or loss of bowel or bladder control without the urge to defecate or urinate.     If you have a clinical question that cannot wait until your next appointment, please call [phone] between 8am-4pm Monday - Friday or send a [employer] [facility] message. We do our best to return all non-emergency messages within 24 hours, Monday - Friday. A nurse or physician will return your message.      If you need to cancel an appointment, please call the scheduling staff at [phone] during normal business hours or leave a message at least 24 hours in advance.     If you are going to be sedated for your next procedure, you MUST have responsible adult who can legally drive accompany you home. You cannot eat or drink for eight hours prior to the planned procedure if you are going to receive sedation. You may take your non-blood thinning medications with a small sip of water.

## 2023-10-30 NOTE — POST-PROCEDURE NOTE
Returns from procedure awake and conversing. Bed low & locked. Bandaids x 2 d/I. Visitor @ bedside.    0912-Ambulates without difficulty.    0915-Pt educated on discharge instructions.    0917-Ambulatory discharge with family.

## 2023-10-30 NOTE — Clinical Note
Right greater trochanter bursa injection complete. Bandaid applied to site. Patient assisted with repositioning to right lateral position.

## 2023-11-20 ENCOUNTER — HOSPITAL ENCOUNTER (EMERGENCY)
Facility: HOSPITAL | Age: 43
Discharge: HOME | End: 2023-11-20
Payer: COMMERCIAL

## 2023-11-20 ENCOUNTER — PHARMACY VISIT (OUTPATIENT)
Dept: PHARMACY | Facility: CLINIC | Age: 43
End: 2023-11-20
Payer: MEDICAID

## 2023-11-20 VITALS
SYSTOLIC BLOOD PRESSURE: 128 MMHG | OXYGEN SATURATION: 95 % | BODY MASS INDEX: 37.21 KG/M2 | DIASTOLIC BLOOD PRESSURE: 87 MMHG | HEIGHT: 63 IN | HEART RATE: 77 BPM | WEIGHT: 210 LBS | RESPIRATION RATE: 16 BRPM | TEMPERATURE: 98.2 F

## 2023-11-20 DIAGNOSIS — L03.039 CELLULITIS OF TOE, UNSPECIFIED LATERALITY: Primary | ICD-10-CM

## 2023-11-20 PROCEDURE — 99285 EMERGENCY DEPT VISIT HI MDM: CPT

## 2023-11-20 PROCEDURE — 99283 EMERGENCY DEPT VISIT LOW MDM: CPT

## 2023-11-20 RX ORDER — DOXYCYCLINE 100 MG/1
100 TABLET ORAL 2 TIMES DAILY
Qty: 20 TABLET | Refills: 0 | Status: SHIPPED | OUTPATIENT
Start: 2023-11-20 | End: 2023-11-30

## 2023-11-20 RX ORDER — SULFAMETHOXAZOLE AND TRIMETHOPRIM 800; 160 MG/1; MG/1
1 TABLET ORAL 2 TIMES DAILY
Qty: 20 TABLET | Refills: 0 | Status: SHIPPED | OUTPATIENT
Start: 2023-11-20 | End: 2023-11-30

## 2023-11-20 ASSESSMENT — PAIN - FUNCTIONAL ASSESSMENT: PAIN_FUNCTIONAL_ASSESSMENT: 0-10

## 2023-11-20 ASSESSMENT — PAIN SCALES - GENERAL: PAINLEVEL_OUTOF10: 7

## 2023-11-20 NOTE — ED PROVIDER NOTES
"HPI   Chief Complaint   Patient presents with    Earache     Left ear    Toe Pain     Bilateral great toe nail removal done on 11/13/2023 and \"they hurt, throbbing like I hit them with a hammer\"       History of present illness:  43-year-old female presents to the emergency room for complaints of bilateral great toe pain.  The patient states that earlier this week or late last leg she believes that she had new home toenails that were taken care of by her podiatrist.  She states that they removed the toenails and she states that she has been doing Epsom salt baths for the past 24 hours she has no swelling around the edges of the toenail beds bilaterally and she has noticed some purulent discharge.  She attempted to call the podiatrist office today but was told that they are out of the office until next week.  She states that she has no fevers or chills or any other symptoms at this time and confirms that she is a smoker as well as a type II diabetic.  She states her sugar has been running fine at this time.  She was not placed on antibiotics.  She states that she has also been having left ear pain that began this morning.  She denies any sinus congestion or sore throat or any other symptoms at this time.    Social history: Negative for alcohol and drug use.    Review of systems:   Gen.: No weight loss,  fever.   Eyes: No vision loss, double vision  ENT: No pharyngitis, neck pain  Cardiac: No chest pain, palpitations, syncope  Pulmonary: No shortness of breath, cough, hemoptysis.   Heme/lymph: No swollen glands, fever, bleeding.   GI: No abdominal pain, change in bowel habits, melena, hematemesis, hematochezia, nausea, vomiting, diarrhea.   : No discharge, dysuria, frequency, urgency, hematuria.   Musculoskeletal: No joint pain, joint swelling.   Skin: No rashes.   Review of systems is otherwise negative unless stated above or in history of present illness.      Physical exam:  General: Vitals noted, no distress. " Afebrile.   EENT: No lymphadenopathy appreciated, left tympanic membrane is unremarkable as is the right  Cardiac: Regular, rate, rhythm, no murmur.   Pulmonary: Lungs clear bilaterally with good aeration. No adventitious breath sounds.   Abdomen: Soft, nonsurgical. Nontender. No peritoneal signs. Normoactive bowel sounds.   Extremities: No peripheral edema.  The great toes bilaterally appear to have some minor cellulitis present at the edge of the nailbeds and there is some purulent discharge present as well, there is no paronychia present there is no cellulitis extending onto the toe itself otherwise and it does not appear to be present on the feet  Skin: No rash.   Neuro: No focal neurologic deficits        Medical decision making:   Testing: Clinical exam  Plan: Home-going.  Discussed differential. Will follow-up with the primary physician in the next 2-3 days. Return if worse. They understand return precautions and discharge instructions. Patient and family/friend/caregiver are in agreement with this plan. 43-year-old female presents to the emergency room for complaints of bilateral great toe pain.  The patient states that earlier this week or late last leg she believes that she had new home toenails that were taken care of by her podiatrist.  She states that they removed the toenails and she states that she has been doing Epsom salt baths for the past 24 hours she has no swelling around the edges of the toenail beds bilaterally and she has noticed some purulent discharge.  She attempted to call the podiatrist office today but was told that they are out of the office until next week.  She states that she has no fevers or chills or any other symptoms at this time and confirms that she is a smoker as well as a type II diabetic.  She states her sugar has been running fine at this time.  She was not placed on antibiotics.  She states that she has also been having left ear pain that began this morning.  She denies  any sinus congestion or sore throat or any other symptoms at this time. Extremities: No peripheral edema.  The great toes bilaterally appear to have some minor cellulitis present at the edge of the nailbeds and there is some purulent discharge present as well, there is no paronychia present there is no cellulitis extending onto the toe itself otherwise and it does not appear to be present on the feet. EENT: No lymphadenopathy appreciated, left tympanic membrane is unremarkable as is the right.  I explained to the patient be sending home on a short course of doxycycline this time and encouraged her to please return the event that she developed any worsening symptoms including a fever or further concerning signs of cellulitis.  The patient was agreeable this plan and asked advised her I did not see anything in her ear at this time as I believe she may be suffering from eustachian tube dysfunction.  I explained to her that she can take over-the-counter Nasacort or something else at this time to help alleviate her symptoms.  I strongly encouraged her to please follow-up closely with her podiatrist or to return event that she developed any further symptoms.  Impression:   1.  Cellulitis  2.  Eustachian tube dysfunction          History provided by:  Patient   used: No                        No data recorded                Patient History   Past Medical History:   Diagnosis Date    Anxiety disorder, unspecified     Anxiety    Edema, unspecified 09/01/2015    Swelling    Other conditions influencing health status 09/01/2015    Bulging disc    Other muscle spasm 09/01/2015    Trapezius muscle spasm    Personal history of diseases of the blood and blood-forming organs and certain disorders involving the immune mechanism     History of bleeding disorder    Personal history of malignant neoplasm of ovary 06/30/2015    History of ovarian cancer    Personal history of malignant neoplasm, unspecified      History of malignant neoplasm    Personal history of other diseases of the circulatory system     History of hypertension    Personal history of other diseases of the digestive system 12/12/2014    History of colitis    Personal history of other diseases of the digestive system     History of gastroesophageal reflux (GERD)    Personal history of other diseases of the respiratory system     History of bronchitis    Personal history of other diseases of the respiratory system     History of lung disease    Personal history of other diseases of urinary system     History of bladder problems    Personal history of other endocrine, nutritional and metabolic disease     History of hypercholesterolemia    Personal history of other endocrine, nutritional and metabolic disease     History of thyroid disorder    Personal history of other mental and behavioral disorders     History of depression    Personal history of other specified conditions 12/12/2014    History of edema    Personal history of other specified conditions     History of heartburn    Personal history of other specified conditions     History of shortness of breath    Post-traumatic stress disorder, unspecified     PTSD (post-traumatic stress disorder)     Past Surgical History:   Procedure Laterality Date    APPENDECTOMY  08/27/2014    Appendectomy    HYSTERECTOMY  08/27/2014    Hysterectomy    OTHER SURGICAL HISTORY  11/12/2018    Nose surgery    OTHER SURGICAL HISTORY  11/12/2018    Oral surgery    OTHER SURGICAL HISTORY  11/12/2018    Hand surgery     Family History   Problem Relation Name Age of Onset    Hyperlipidemia Mother      Hypertension Mother      Stroke Mother      Other (Fibromyalgia,hypertension, CVA) Mother      Heart block Mother      No Known Problems Father fam hx unk     Other (drug addiction) Brother       Social History     Tobacco Use    Smoking status: Every Day     Packs/day: .5     Types: Cigarettes    Smokeless tobacco: Never    Vaping Use    Vaping Use: Never used   Substance Use Topics    Alcohol use: Never    Drug use: Never       Physical Exam   ED Triage Vitals [11/20/23 1146]   Temp Heart Rate Resp BP   36.8 °C (98.2 °F) 77 16 128/87      SpO2 Temp Source Heart Rate Source Patient Position   95 % Oral -- --      BP Location FiO2 (%)     -- --       Physical Exam    ED Course & MDM   Diagnoses as of 11/20/23 1200   Cellulitis of toe, unspecified laterality       Medical Decision Making      Procedure  Procedures     Everardo Mariano PA-C  11/20/23 7841
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
## 2023-11-28 ENCOUNTER — APPOINTMENT (OUTPATIENT)
Dept: PAIN MEDICINE | Facility: CLINIC | Age: 43
End: 2023-11-28
Payer: COMMERCIAL

## 2024-01-11 ENCOUNTER — OFFICE VISIT (OUTPATIENT)
Dept: PAIN MEDICINE | Facility: CLINIC | Age: 44
End: 2024-01-11
Payer: COMMERCIAL

## 2024-01-11 VITALS
HEIGHT: 63 IN | BODY MASS INDEX: 36.32 KG/M2 | HEART RATE: 84 BPM | DIASTOLIC BLOOD PRESSURE: 60 MMHG | WEIGHT: 205 LBS | SYSTOLIC BLOOD PRESSURE: 106 MMHG | RESPIRATION RATE: 18 BRPM

## 2024-01-11 DIAGNOSIS — G89.29 CHRONIC BILATERAL LOW BACK PAIN WITHOUT SCIATICA: ICD-10-CM

## 2024-01-11 DIAGNOSIS — M79.18 MYOFASCIAL PAIN: Primary | ICD-10-CM

## 2024-01-11 DIAGNOSIS — M54.50 CHRONIC BILATERAL LOW BACK PAIN WITHOUT SCIATICA: ICD-10-CM

## 2024-01-11 DIAGNOSIS — M70.62 GREATER TROCHANTERIC BURSITIS OF BOTH HIPS: ICD-10-CM

## 2024-01-11 DIAGNOSIS — M70.61 GREATER TROCHANTERIC BURSITIS OF BOTH HIPS: ICD-10-CM

## 2024-01-11 PROCEDURE — 99214 OFFICE O/P EST MOD 30 MIN: CPT | Performed by: STUDENT IN AN ORGANIZED HEALTH CARE EDUCATION/TRAINING PROGRAM

## 2024-01-11 ASSESSMENT — PAIN SCALES - GENERAL
PAINLEVEL_OUTOF10: 2
PAINLEVEL: 2

## 2024-01-11 ASSESSMENT — PAIN - FUNCTIONAL ASSESSMENT: PAIN_FUNCTIONAL_ASSESSMENT: 0-10

## 2024-01-11 ASSESSMENT — PATIENT HEALTH QUESTIONNAIRE - PHQ9
2. FEELING DOWN, DEPRESSED OR HOPELESS: NEARLY EVERY DAY
4. FEELING TIRED OR HAVING LITTLE ENERGY: NEARLY EVERY DAY
10. IF YOU CHECKED OFF ANY PROBLEMS, HOW DIFFICULT HAVE THESE PROBLEMS MADE IT FOR YOU TO DO YOUR WORK, TAKE CARE OF THINGS AT HOME, OR GET ALONG WITH OTHER PEOPLE: EXTREMELY DIFFICULT
5. POOR APPETITE OR OVEREATING: SEVERAL DAYS
1. LITTLE INTEREST OR PLEASURE IN DOING THINGS: NEARLY EVERY DAY
SUM OF ALL RESPONSES TO PHQ QUESTIONS 1-9: 22
8. MOVING OR SPEAKING SO SLOWLY THAT OTHER PEOPLE COULD HAVE NOTICED. OR THE OPPOSITE, BEING SO FIGETY OR RESTLESS THAT YOU HAVE BEEN MOVING AROUND A LOT MORE THAN USUAL: NEARLY EVERY DAY
7. TROUBLE CONCENTRATING ON THINGS, SUCH AS READING THE NEWSPAPER OR WATCHING TELEVISION: NEARLY EVERY DAY
SUM OF ALL RESPONSES TO PHQ9 QUESTIONS 1 AND 2: 6
6. FEELING BAD ABOUT YOURSELF - OR THAT YOU ARE A FAILURE OR HAVE LET YOURSELF OR YOUR FAMILY DOWN: NEARLY EVERY DAY
9. THOUGHTS THAT YOU WOULD BE BETTER OFF DEAD, OR OF HURTING YOURSELF: NOT AT ALL
3. TROUBLE FALLING OR STAYING ASLEEP OR SLEEPING TOO MUCH: NEARLY EVERY DAY

## 2024-01-11 ASSESSMENT — PAIN DESCRIPTION - DESCRIPTORS: DESCRIPTORS: BURNING;SHOOTING
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
## 2024-01-16 ENCOUNTER — TELEPHONE (OUTPATIENT)
Dept: PAIN MEDICINE | Facility: CLINIC | Age: 44
End: 2024-01-16
Payer: COMMERCIAL

## 2024-01-16 NOTE — TELEPHONE ENCOUNTER
Left message for the patient to call and schedule appt for trigger point injection only. Patient was just here last week for a follow up.

## 2024-01-22 ENCOUNTER — TELEPHONE (OUTPATIENT)
Dept: PAIN MEDICINE | Facility: CLINIC | Age: 44
End: 2024-01-22
Payer: COMMERCIAL

## 2024-01-30 DIAGNOSIS — M79.18 MYOFASCIAL PAIN: Primary | ICD-10-CM

## 2024-02-01 ENCOUNTER — APPOINTMENT (OUTPATIENT)
Dept: PAIN MEDICINE | Facility: CLINIC | Age: 44
End: 2024-02-01
Payer: COMMERCIAL
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
## 2024-05-03 ENCOUNTER — HOSPITAL ENCOUNTER (OUTPATIENT)
Dept: RADIOLOGY | Facility: HOSPITAL | Age: 44
Discharge: HOME | End: 2024-05-03
Payer: COMMERCIAL

## 2024-05-03 VITALS — BODY MASS INDEX: 36.32 KG/M2 | WEIGHT: 205 LBS | HEIGHT: 63 IN

## 2024-05-03 DIAGNOSIS — Z12.31 ENCOUNTER FOR SCREENING MAMMOGRAM FOR MALIGNANT NEOPLASM OF BREAST: ICD-10-CM

## 2024-05-03 PROCEDURE — 77067 SCR MAMMO BI INCL CAD: CPT | Performed by: RADIOLOGY

## 2024-05-03 PROCEDURE — 77067 SCR MAMMO BI INCL CAD: CPT

## 2024-05-03 PROCEDURE — 77063 BREAST TOMOSYNTHESIS BI: CPT | Performed by: RADIOLOGY

## 2024-07-02 ENCOUNTER — APPOINTMENT (OUTPATIENT)
Dept: NEUROLOGY | Facility: CLINIC | Age: 44
End: 2024-07-02
Payer: COMMERCIAL

## 2024-10-03 ENCOUNTER — HOSPITAL ENCOUNTER (EMERGENCY)
Facility: HOSPITAL | Age: 44
Discharge: HOME | End: 2024-10-03
Payer: COMMERCIAL

## 2024-10-03 ENCOUNTER — APPOINTMENT (OUTPATIENT)
Dept: RADIOLOGY | Facility: HOSPITAL | Age: 44
End: 2024-10-03
Payer: COMMERCIAL

## 2024-10-03 VITALS
WEIGHT: 207.23 LBS | TEMPERATURE: 96.4 F | HEIGHT: 63 IN | BODY MASS INDEX: 36.72 KG/M2 | DIASTOLIC BLOOD PRESSURE: 74 MMHG | RESPIRATION RATE: 18 BRPM | HEART RATE: 84 BPM | OXYGEN SATURATION: 96 % | SYSTOLIC BLOOD PRESSURE: 127 MMHG

## 2024-10-03 DIAGNOSIS — R51.9 ACUTE NONINTRACTABLE HEADACHE, UNSPECIFIED HEADACHE TYPE: Primary | ICD-10-CM

## 2024-10-03 LAB
ALBUMIN SERPL BCP-MCNC: 4.4 G/DL (ref 3.4–5)
ALP SERPL-CCNC: 88 U/L (ref 33–110)
ALT SERPL W P-5'-P-CCNC: 33 U/L (ref 7–45)
ANION GAP SERPL CALCULATED.3IONS-SCNC: 14 MMOL/L (ref 10–20)
APPEARANCE UR: CLEAR
AST SERPL W P-5'-P-CCNC: 26 U/L (ref 9–39)
BASOPHILS # BLD AUTO: 0.07 X10*3/UL (ref 0–0.1)
BASOPHILS NFR BLD AUTO: 0.7 %
BILIRUB SERPL-MCNC: 0.3 MG/DL (ref 0–1.2)
BILIRUB UR STRIP.AUTO-MCNC: NEGATIVE MG/DL
BUN SERPL-MCNC: 11 MG/DL (ref 6–23)
CALCIUM SERPL-MCNC: 9.8 MG/DL (ref 8.6–10.3)
CHLORIDE SERPL-SCNC: 99 MMOL/L (ref 98–107)
CO2 SERPL-SCNC: 26 MMOL/L (ref 21–32)
COLOR UR: YELLOW
CREAT SERPL-MCNC: 0.5 MG/DL (ref 0.5–1.05)
EGFRCR SERPLBLD CKD-EPI 2021: >90 ML/MIN/1.73M*2
EOSINOPHIL # BLD AUTO: 0.22 X10*3/UL (ref 0–0.7)
EOSINOPHIL NFR BLD AUTO: 2.1 %
ERYTHROCYTE [DISTWIDTH] IN BLOOD BY AUTOMATED COUNT: 12.8 % (ref 11.5–14.5)
GLUCOSE SERPL-MCNC: 245 MG/DL (ref 74–99)
GLUCOSE UR STRIP.AUTO-MCNC: ABNORMAL MG/DL
HCT VFR BLD AUTO: 39.9 % (ref 36–46)
HGB BLD-MCNC: 13.6 G/DL (ref 12–16)
HYALINE CASTS #/AREA URNS AUTO: ABNORMAL /LPF
IMM GRANULOCYTES # BLD AUTO: 0.06 X10*3/UL (ref 0–0.7)
IMM GRANULOCYTES NFR BLD AUTO: 0.6 % (ref 0–0.9)
KETONES UR STRIP.AUTO-MCNC: NEGATIVE MG/DL
LEUKOCYTE ESTERASE UR QL STRIP.AUTO: NEGATIVE
LYMPHOCYTES # BLD AUTO: 4.45 X10*3/UL (ref 1.2–4.8)
LYMPHOCYTES NFR BLD AUTO: 42.4 %
MAGNESIUM SERPL-MCNC: 1.33 MG/DL (ref 1.6–2.4)
MCH RBC QN AUTO: 28.3 PG (ref 26–34)
MCHC RBC AUTO-ENTMCNC: 34.1 G/DL (ref 32–36)
MCV RBC AUTO: 83 FL (ref 80–100)
MONOCYTES # BLD AUTO: 0.59 X10*3/UL (ref 0.1–1)
MONOCYTES NFR BLD AUTO: 5.6 %
MUCOUS THREADS #/AREA URNS AUTO: ABNORMAL /LPF
NEUTROPHILS # BLD AUTO: 5.11 X10*3/UL (ref 1.2–7.7)
NEUTROPHILS NFR BLD AUTO: 48.6 %
NITRITE UR QL STRIP.AUTO: NEGATIVE
NRBC BLD-RTO: 0 /100 WBCS (ref 0–0)
PH UR STRIP.AUTO: 6 [PH]
PLATELET # BLD AUTO: 247 X10*3/UL (ref 150–450)
POTASSIUM SERPL-SCNC: 3.6 MMOL/L (ref 3.5–5.3)
PROT SERPL-MCNC: 7.2 G/DL (ref 6.4–8.2)
PROT UR STRIP.AUTO-MCNC: ABNORMAL MG/DL
RBC # BLD AUTO: 4.8 X10*6/UL (ref 4–5.2)
RBC # UR STRIP.AUTO: NEGATIVE /UL
RBC #/AREA URNS AUTO: ABNORMAL /HPF
SODIUM SERPL-SCNC: 135 MMOL/L (ref 136–145)
SP GR UR STRIP.AUTO: 1.03
SQUAMOUS #/AREA URNS AUTO: ABNORMAL /HPF
UROBILINOGEN UR STRIP.AUTO-MCNC: NORMAL MG/DL
WBC # BLD AUTO: 10.5 X10*3/UL (ref 4.4–11.3)
WBC #/AREA URNS AUTO: ABNORMAL /HPF

## 2024-10-03 PROCEDURE — 80053 COMPREHEN METABOLIC PANEL: CPT

## 2024-10-03 PROCEDURE — 99284 EMERGENCY DEPT VISIT MOD MDM: CPT | Mod: 25

## 2024-10-03 PROCEDURE — 96375 TX/PRO/DX INJ NEW DRUG ADDON: CPT

## 2024-10-03 PROCEDURE — 83735 ASSAY OF MAGNESIUM: CPT

## 2024-10-03 PROCEDURE — 85025 COMPLETE CBC W/AUTO DIFF WBC: CPT

## 2024-10-03 PROCEDURE — 70450 CT HEAD/BRAIN W/O DYE: CPT

## 2024-10-03 PROCEDURE — 36415 COLL VENOUS BLD VENIPUNCTURE: CPT

## 2024-10-03 PROCEDURE — 96365 THER/PROPH/DIAG IV INF INIT: CPT

## 2024-10-03 PROCEDURE — 81001 URINALYSIS AUTO W/SCOPE: CPT

## 2024-10-03 PROCEDURE — 2500000004 HC RX 250 GENERAL PHARMACY W/ HCPCS (ALT 636 FOR OP/ED)

## 2024-10-03 PROCEDURE — 70450 CT HEAD/BRAIN W/O DYE: CPT | Performed by: RADIOLOGY

## 2024-10-03 RX ORDER — PROCHLORPERAZINE EDISYLATE 5 MG/ML
10 INJECTION INTRAMUSCULAR; INTRAVENOUS ONCE
Status: COMPLETED | OUTPATIENT
Start: 2024-10-03 | End: 2024-10-03

## 2024-10-03 RX ORDER — DIPHENHYDRAMINE HYDROCHLORIDE 50 MG/ML
25 INJECTION INTRAMUSCULAR; INTRAVENOUS ONCE
Status: COMPLETED | OUTPATIENT
Start: 2024-10-03 | End: 2024-10-03

## 2024-10-03 RX ORDER — ACETAMINOPHEN 10 MG/ML
1000 INJECTION, SOLUTION INTRAVENOUS ONCE
Status: COMPLETED | OUTPATIENT
Start: 2024-10-03 | End: 2024-10-03

## 2024-10-03 RX ORDER — DEXAMETHASONE SODIUM PHOSPHATE 10 MG/ML
10 INJECTION INTRAMUSCULAR; INTRAVENOUS ONCE
Status: COMPLETED | OUTPATIENT
Start: 2024-10-03 | End: 2024-10-03

## 2024-10-03 RX ORDER — LANOLIN ALCOHOL/MO/W.PET/CERES
800 CREAM (GRAM) TOPICAL ONCE
Status: COMPLETED | OUTPATIENT
Start: 2024-10-03 | End: 2024-10-03

## 2024-10-03 RX ORDER — KETOROLAC TROMETHAMINE 30 MG/ML
15 INJECTION, SOLUTION INTRAMUSCULAR; INTRAVENOUS ONCE
Status: COMPLETED | OUTPATIENT
Start: 2024-10-03 | End: 2024-10-03

## 2024-10-03 RX ADMIN — DEXAMETHASONE SODIUM PHOSPHATE 10 MG: 10 INJECTION INTRAMUSCULAR; INTRAVENOUS at 18:28

## 2024-10-03 RX ADMIN — Medication 800 MG: at 18:28

## 2024-10-03 RX ADMIN — KETOROLAC TROMETHAMINE 15 MG: 30 INJECTION, SOLUTION INTRAMUSCULAR at 18:28

## 2024-10-03 RX ADMIN — DIPHENHYDRAMINE HYDROCHLORIDE 25 MG: 50 INJECTION, SOLUTION INTRAMUSCULAR; INTRAVENOUS at 16:07

## 2024-10-03 RX ADMIN — ACETAMINOPHEN 1000 MG: 10 INJECTION INTRAVENOUS at 16:07

## 2024-10-03 RX ADMIN — SODIUM CHLORIDE 500 ML: 900 INJECTION, SOLUTION INTRAVENOUS at 16:07

## 2024-10-03 RX ADMIN — PROCHLORPERAZINE EDISYLATE 10 MG: 5 INJECTION INTRAMUSCULAR; INTRAVENOUS at 16:07

## 2024-10-03 ASSESSMENT — PAIN DESCRIPTION - ONSET: ONSET: SUDDEN

## 2024-10-03 ASSESSMENT — PAIN DESCRIPTION - ORIENTATION
ORIENTATION: LEFT
ORIENTATION: LEFT

## 2024-10-03 ASSESSMENT — PAIN DESCRIPTION - FREQUENCY: FREQUENCY: CONSTANT/CONTINUOUS

## 2024-10-03 ASSESSMENT — PAIN DESCRIPTION - DESCRIPTORS
DESCRIPTORS: ACHING
DESCRIPTORS: PRESSURE;SHARP;STABBING

## 2024-10-03 ASSESSMENT — COLUMBIA-SUICIDE SEVERITY RATING SCALE - C-SSRS
6. HAVE YOU EVER DONE ANYTHING, STARTED TO DO ANYTHING, OR PREPARED TO DO ANYTHING TO END YOUR LIFE?: NO
2. HAVE YOU ACTUALLY HAD ANY THOUGHTS OF KILLING YOURSELF?: NO
1. IN THE PAST MONTH, HAVE YOU WISHED YOU WERE DEAD OR WISHED YOU COULD GO TO SLEEP AND NOT WAKE UP?: NO

## 2024-10-03 ASSESSMENT — PAIN DESCRIPTION - PAIN TYPE
TYPE: CHRONIC PAIN
TYPE: ACUTE PAIN

## 2024-10-03 ASSESSMENT — PAIN - FUNCTIONAL ASSESSMENT
PAIN_FUNCTIONAL_ASSESSMENT: 0-10
PAIN_FUNCTIONAL_ASSESSMENT: 0-10

## 2024-10-03 ASSESSMENT — PAIN DESCRIPTION - PROGRESSION: CLINICAL_PROGRESSION: NOT CHANGED

## 2024-10-03 ASSESSMENT — PAIN DESCRIPTION - LOCATION
LOCATION: HEAD
LOCATION: HEAD

## 2024-10-03 ASSESSMENT — PAIN SCALES - GENERAL
PAINLEVEL_OUTOF10: 8
PAINLEVEL_OUTOF10: 0 - NO PAIN
PAINLEVEL_OUTOF10: 5 - MODERATE PAIN

## 2024-10-03 NOTE — Clinical Note
Henrique Delcid was seen and treated in our emergency department on 10/3/2024.  She may return to work on 10/04/2024.       If you have any questions or concerns, please don't hesitate to call.      Jaycob Cabrales, DO

## 2024-10-03 NOTE — ED PROVIDER NOTES
HPI   Chief Complaint   Patient presents with    Headache     For the past 6 days I have had pressure on the lt side of my head        Patient is a 44-year-old female presenting with a chief complaint of a headache.  Patient states that she has had this headache for the last 6 days, she is taken Tylenol and ibuprofen, as well as a headache pill, she states this has not gotten any better.  Patient denies any dizziness or blurred vision, denies any fevers or chills, nausea, vomiting, patient does have photo and phonophobia, patient has no chest pain or shortness of breath, no other acute complaint at this time      History provided by:  Patient          Patient History   Past Medical History:   Diagnosis Date    Anxiety disorder, unspecified     Anxiety    Edema, unspecified 09/01/2015    Swelling    Other conditions influencing health status 09/01/2015    Bulging disc    Other muscle spasm 09/01/2015    Trapezius muscle spasm    Personal history of diseases of the blood and blood-forming organs and certain disorders involving the immune mechanism     History of bleeding disorder    Personal history of malignant neoplasm of ovary 06/30/2015    History of ovarian cancer    Personal history of malignant neoplasm, unspecified     History of malignant neoplasm    Personal history of other diseases of the circulatory system     History of hypertension    Personal history of other diseases of the digestive system 12/12/2014    History of colitis    Personal history of other diseases of the digestive system     History of gastroesophageal reflux (GERD)    Personal history of other diseases of the respiratory system     History of bronchitis    Personal history of other diseases of the respiratory system     History of lung disease    Personal history of other diseases of urinary system     History of bladder problems    Personal history of other endocrine, nutritional and metabolic disease     History of hypercholesterolemia     Personal history of other endocrine, nutritional and metabolic disease     History of thyroid disorder    Personal history of other mental and behavioral disorders     History of depression    Personal history of other specified conditions 12/12/2014    History of edema    Personal history of other specified conditions     History of heartburn    Personal history of other specified conditions     History of shortness of breath    Post-traumatic stress disorder, unspecified     PTSD (post-traumatic stress disorder)     Past Surgical History:   Procedure Laterality Date    APPENDECTOMY  08/27/2014    Appendectomy    HYSTERECTOMY  08/27/2014    Hysterectomy    OTHER SURGICAL HISTORY  11/12/2018    Nose surgery    OTHER SURGICAL HISTORY  11/12/2018    Oral surgery    OTHER SURGICAL HISTORY  11/12/2018    Hand surgery     Family History   Problem Relation Name Age of Onset    Hyperlipidemia Mother      Hypertension Mother      Stroke Mother      Other (Fibromyalgia,hypertension, CVA) Mother      Heart block Mother      No Known Problems Father fam hx unk     Other (drug addiction) Brother       Social History     Tobacco Use    Smoking status: Every Day     Current packs/day: 0.50     Types: Cigarettes    Smokeless tobacco: Never   Vaping Use    Vaping status: Never Used   Substance Use Topics    Alcohol use: Never    Drug use: Not Currently     Frequency: 1.0 times per week     Types: Marijuana     Comment: Used edibles once last week due to pain       Physical Exam   ED Triage Vitals [10/03/24 1529]   Temperature Heart Rate Respirations BP   35.8 °C (96.4 °F) 84 18 127/74      Pulse Ox Temp Source Heart Rate Source Patient Position   96 % Temporal Monitor Sitting      BP Location FiO2 (%)     Left arm --       Physical Exam  Vitals and nursing note reviewed.   Constitutional:       General: She is not in acute distress.     Appearance: She is well-developed and normal weight. She is not ill-appearing,  toxic-appearing or diaphoretic.   HENT:      Head: Normocephalic and atraumatic.      Mouth/Throat:      Mouth: Mucous membranes are moist.      Pharynx: Oropharynx is clear.   Eyes:      Pupils: Pupils are equal, round, and reactive to light.   Cardiovascular:      Rate and Rhythm: Normal rate and regular rhythm.      Heart sounds: Normal heart sounds.   Pulmonary:      Effort: Pulmonary effort is normal.      Breath sounds: Normal breath sounds.   Abdominal:      General: Bowel sounds are normal.      Palpations: Abdomen is soft.   Musculoskeletal:         General: Normal range of motion.      Cervical back: Normal range of motion and neck supple.   Skin:     General: Skin is warm and dry.      Capillary Refill: Capillary refill takes less than 2 seconds.   Neurological:      Mental Status: She is alert and oriented to person, place, and time. Mental status is at baseline.      GCS: GCS eye subscore is 4. GCS verbal subscore is 5. GCS motor subscore is 6.   Psychiatric:         Mood and Affect: Mood normal.         Speech: Speech normal.         Behavior: Behavior normal.           ED Course & MDM   Diagnoses as of 10/03/24 1554   Acute nonintractable headache, unspecified headache type                 No data recorded     [place] Coma Scale Score: 15 (10/03/24 1526 : Luis Patiño, EMT)                           Medical Decision Making  Patient seen and evaluated at bedside, patient is in no acute distress.  I will order a CT head, CBC, CMP, Ofirmev, Compazine, Benadryl,. Differential diagnosis includes but is not limited to headache, migraine,.  Patient's lab work, CT head are reassuring.  Patient does report improvement of symptoms, patient was given additional dose of Toradol, Decadron prior to departure.  Patient given referral to neurology, she prior did follow-up with neurology, however the provider she has follow-up with the practice.  Patient be discharged home, return precautions discussed, she is  agreeable to discharge plan.    Diagnosis: Headache  CT head wo IV contrast   Final Result    No acute intracranial pathology.          MACRO:    None          Signed by: Winifred Michaud 10/3/2024 4:14 PM    Dictation workstation:   [id number]     Results for orders placed or performed during the hospital encounter of 10/03/24  -CBC and Auto Differential:        Result                      Value             Ref Range           WBC                         10.5              4.4 - 11.3 x*       nRBC                        0.0               0.0 - 0.0 /1*       RBC                         4.80              4.00 - 5.20 *       Hemoglobin                  13.6              12.0 - 16.0 *       Hematocrit                  39.9              36.0 - 46.0 %       MCV                         83                80 - 100 fL         MCH                         28.3              26.0 - 34.0 *       MCHC                        34.1              32.0 - 36.0 *       RDW                         12.8              11.5 - 14.5 %       Platelets                   247               150 - 450 x1*       Neutrophils %               48.6              40.0 - 80.0 %       Immature Granulocytes *     0.6               0.0 - 0.9 %         Lymphocytes %               42.4              13.0 - 44.0 %       Monocytes %                 5.6               2.0 - 10.0 %        Eosinophils %               2.1               0.0 - 6.0 %         Basophils %                 0.7               0.0 - 2.0 %         Neutrophils Absolute        5.11              1.20 - 7.70 *       Immature Granulocytes *     0.06              0.00 - 0.70 *       Lymphocytes Absolute        4.45              1.20 - 4.80 *       Monocytes Absolute          0.59              0.10 - 1.00 *       Eosinophils Absolute        0.22              0.00 - 0.70 *       Basophils Absolute          0.07              0.00 - 0.10 *  -Magnesium:        Result                      Value             Ref  Range           Magnesium                   1.33 (L)          1.60 - 2.40 *  -Comprehensive metabolic panel:        Result                      Value             Ref Range           Glucose                     245 (H)           74 - 99 mg/dL       Sodium                      135 (L)           136 - 145 mm*       Potassium                   3.6               3.5 - 5.3 mm*       Chloride                    99                98 - 107 mmo*       Bicarbonate                 26                21 - 32 mmol*       Anion Gap                   14                10 - 20 mmol*       Urea Nitrogen               11                6 - 23 mg/dL        Creatinine                  0.50              0.50 - 1.05 *       eGFR                        >90               >60 mL/min/1*       Calcium                     9.8               8.6 - 10.3 m*       Albumin                     4.4               3.4 - 5.0 g/*       Alkaline Phosphatase        88                33 - 110 U/L        Total Protein               7.2               6.4 - 8.2 g/*       AST                         26                9 - 39 U/L          Bilirubin, Total            0.3               0.0 - 1.2 mg*       ALT                         33                7 - 45 U/L     -Urinalysis with Reflex Culture and Microscopic:        Result                      Value             Ref Range           Color, Urine                Yellow            Light-Yellow*       Appearance, Urine           Clear             Clear               Specific Gravity, Urine     1.026             1.005 - 1.035       pH, Urine                   6.0               5.0, 5.5, 6.*       Protein, Urine              10 (TRACE)        NEGATIVE, 10*       Glucose, Urine              500 (3+) (A)      Normal mg/dL        Blood, Urine                NEGATIVE          NEGATIVE            Ketones, Urine              NEGATIVE          NEGATIVE mg/*       Bilirubin, Urine            NEGATIVE          NEGATIVE             Urobilinogen, Urine         Normal            Normal mg/dL        Nitrite, Urine              NEGATIVE          NEGATIVE            Leukocyte Esterase, Ur*     NEGATIVE          NEGATIVE       -Urinalysis Microscopic:        Result                      Value             Ref Range           WBC, Urine                  1-5               1-5, NONE /H*       RBC, Urine                  NONE              NONE, 1-2, 3*       Squamous Epithelial Ce*     10-25 (FEW)       Reference ra*       Mucus, Urine                FEW               Reference ra*       Hyaline Casts, Urine                          NONE /LPF       OCCASIONAL (A)          Procedure  Procedures    Sections of this report were created using voice-to-text technology and may contain errors in translation    Jaycob Cabrales DO  Emergency Medicine         Jaycob Cabrales DO  10/04/24 0026

## 2024-10-03 NOTE — DISCHARGE INSTRUCTIONS
You were seen today for headache, ear imaging and lab work is reassuring, please follow-up with your primary care provider, please return the ER for any worsening symptoms.

## 2025-03-20 ENCOUNTER — APPOINTMENT (OUTPATIENT)
Dept: NEUROLOGY | Facility: CLINIC | Age: 45
End: 2025-03-20
Payer: COMMERCIAL

## 2025-07-23 ENCOUNTER — HOSPITAL ENCOUNTER (EMERGENCY)
Facility: HOSPITAL | Age: 45
Discharge: HOME | End: 2025-07-23
Attending: EMERGENCY MEDICINE
Payer: COMMERCIAL

## 2025-07-23 VITALS
DIASTOLIC BLOOD PRESSURE: 91 MMHG | OXYGEN SATURATION: 97 % | HEIGHT: 63 IN | WEIGHT: 210 LBS | RESPIRATION RATE: 19 BRPM | SYSTOLIC BLOOD PRESSURE: 161 MMHG | HEART RATE: 98 BPM | TEMPERATURE: 98.2 F | BODY MASS INDEX: 37.21 KG/M2

## 2025-07-23 DIAGNOSIS — T14.8XXA WOUND INFECTION: Primary | ICD-10-CM

## 2025-07-23 DIAGNOSIS — M79.89 SWELLING OF RIGHT HAND: ICD-10-CM

## 2025-07-23 DIAGNOSIS — L03.113 CELLULITIS OF RIGHT HAND: ICD-10-CM

## 2025-07-23 DIAGNOSIS — L08.9 WOUND INFECTION: Primary | ICD-10-CM

## 2025-07-23 PROCEDURE — 99283 EMERGENCY DEPT VISIT LOW MDM: CPT | Performed by: EMERGENCY MEDICINE

## 2025-07-23 PROCEDURE — 2500000001 HC RX 250 WO HCPCS SELF ADMINISTERED DRUGS (ALT 637 FOR MEDICARE OP): Mod: SE | Performed by: EMERGENCY MEDICINE

## 2025-07-23 RX ORDER — CLINDAMYCIN HYDROCHLORIDE 300 MG/1
300 CAPSULE ORAL 2 TIMES DAILY
Qty: 14 CAPSULE | Refills: 0 | Status: SHIPPED | OUTPATIENT
Start: 2025-07-23 | End: 2025-07-30

## 2025-07-23 RX ORDER — OXYCODONE AND ACETAMINOPHEN 5; 325 MG/1; MG/1
1 TABLET ORAL ONCE
Refills: 0 | Status: COMPLETED | OUTPATIENT
Start: 2025-07-23 | End: 2025-07-23

## 2025-07-23 RX ADMIN — OXYCODONE HYDROCHLORIDE AND ACETAMINOPHEN 1 TABLET: 5; 325 TABLET ORAL at 10:36

## 2025-07-23 ASSESSMENT — PAIN DESCRIPTION - PAIN TYPE: TYPE: ACUTE PAIN

## 2025-07-23 ASSESSMENT — PAIN - FUNCTIONAL ASSESSMENT
PAIN_FUNCTIONAL_ASSESSMENT: 0-10
PAIN_FUNCTIONAL_ASSESSMENT: 0-10

## 2025-07-23 ASSESSMENT — PAIN DESCRIPTION - LOCATION
LOCATION: HAND
LOCATION: HAND

## 2025-07-23 ASSESSMENT — PAIN DESCRIPTION - ORIENTATION
ORIENTATION: RIGHT
ORIENTATION: RIGHT

## 2025-07-23 ASSESSMENT — PAIN DESCRIPTION - DESCRIPTORS
DESCRIPTORS: BURNING
DESCRIPTORS: BURNING

## 2025-07-23 ASSESSMENT — PAIN SCALES - GENERAL
PAINLEVEL_OUTOF10: 9
PAINLEVEL_OUTOF10: 0 - NO PAIN

## 2025-07-23 ASSESSMENT — PAIN DESCRIPTION - PROGRESSION: CLINICAL_PROGRESSION: GRADUALLY WORSENING

## 2025-07-23 ASSESSMENT — PAIN DESCRIPTION - FREQUENCY: FREQUENCY: CONSTANT/CONTINUOUS

## 2025-07-23 NOTE — ED PROVIDER NOTES
HPI   Chief Complaint   Patient presents with    Wound Infection     Pt reports she got a tattoo Saturday, since she got it she's been experiencing pain, swelling, redness, and the tattoo is warm to touch. Pt been having chills.          45-year-old female with history of hyperlipidemia, hypothyroidism, irritable bowel syndrome, cervical cancer, fibromyalgia, and solitary pulmonary nodule presents for evaluation of right hand wound infection.  She got a tattoo on the back of her right hand this past Saturday 7/18.  Subsequently she has developed pain redness and swelling in the area of the tattoo.  There is clear drainage.  She complaining of constant pain in the area.  Right-hand-dominant.  No fevers or vomiting.      History provided by:  Patient and medical records          Patient History   Medical History[1]  Surgical History[2]  Family History[3]  Social History[4]    Physical Exam   ED Triage Vitals [07/23/25 1015]   Temperature Heart Rate Respirations BP   36.8 °C (98.2 °F) 98 19 (!) 161/91      Pulse Ox Temp Source Heart Rate Source Patient Position   97 % Temporal Monitor Sitting      BP Location FiO2 (%)     Left arm --       Physical Exam  Vitals and nursing note reviewed.   Constitutional:       Appearance: Normal appearance.   HENT:      Head: Normocephalic and atraumatic.      Nose: Nose normal.      Mouth/Throat:      Mouth: Mucous membranes are moist.     Eyes:      Extraocular Movements: Extraocular movements intact.      Pupils: Pupils are equal, round, and reactive to light.       Cardiovascular:      Rate and Rhythm: Normal rate and regular rhythm.   Pulmonary:      Effort: Pulmonary effort is normal.      Breath sounds: Normal breath sounds.   Abdominal:      Palpations: Abdomen is soft.      Tenderness: There is no abdominal tenderness.     Musculoskeletal:         General: No deformity.      Right hand: Swelling and tenderness present.      Cervical back: Normal range of motion. No rigidity.       Comments: Dorsum of right hand with soft tissue swelling redness and tenderness in the area of a tattoo.  There is mild surrounding redness.  There is no fluctuance or drainage.     Skin:     General: Skin is warm and dry.      Findings: Erythema present. No abscess.     Neurological:      General: No focal deficit present.      Mental Status: She is alert and oriented to person, place, and time.      Cranial Nerves: No cranial nerve deficit.      Motor: No weakness.     Psychiatric:         Mood and Affect: Mood normal.           ED Course & MDM   ED Course as of 07/23/25 1031   Wed Jul 23, 2025   1030 No constitutional symptoms.  Safe for trial outpatient management.  Penicillin allergic.  Placed on clindamycin.  Warm compresses Tylenol Motrin for pain.  Given 1 Percocet here.  PCP follow-up.  Return for repeat evaluation at anytime with spreading or worsening redness fevers vomiting or any other concerns.  She agrees and verbalized understanding.  Discharged home. [BT]      ED Course User Index  [BT] Brendan Gray DO         Diagnoses as of 07/23/25 1031   Wound infection - tattoo infection   Swelling of right hand   Cellulitis of right hand                 No data recorded     [place] Coma Scale Score: 15 (07/23/25 1021 : Sue Barbosa)                           Medical Decision Making      Procedure  Procedures       [1]   Past Medical History:  Diagnosis Date    Anxiety disorder, unspecified     Anxiety    COPD (chronic obstructive pulmonary disease) (Multi)     Diabetes mellitus (Multi)     Edema, unspecified 09/01/2015    Swelling    Hypertension     Other conditions influencing health status 09/01/2015    Bulging disc    Other muscle spasm 09/01/2015    Trapezius muscle spasm    Personal history of diseases of the blood and blood-forming organs and certain disorders involving the immune mechanism     History of bleeding disorder    Personal history of malignant neoplasm of ovary 06/30/2015     History of ovarian cancer    Personal history of malignant neoplasm, unspecified     History of malignant neoplasm    Personal history of other diseases of the circulatory system     History of hypertension    Personal history of other diseases of the digestive system 12/12/2014    History of colitis    Personal history of other diseases of the digestive system     History of gastroesophageal reflux (GERD)    Personal history of other diseases of the respiratory system     History of bronchitis    Personal history of other diseases of the respiratory system     History of lung disease    Personal history of other diseases of urinary system     History of bladder problems    Personal history of other endocrine, nutritional and metabolic disease     History of hypercholesterolemia    Personal history of other endocrine, nutritional and metabolic disease     History of thyroid disorder    Personal history of other mental and behavioral disorders     History of depression    Personal history of other specified conditions 12/12/2014    History of edema    Personal history of other specified conditions     History of heartburn    Personal history of other specified conditions     History of shortness of breath    Post-traumatic stress disorder, unspecified     PTSD (post-traumatic stress disorder)   [2]   Past Surgical History:  Procedure Laterality Date    APPENDECTOMY  08/27/2014    Appendectomy    HYSTERECTOMY  08/27/2014    Hysterectomy    OTHER SURGICAL HISTORY  11/12/2018    Nose surgery    OTHER SURGICAL HISTORY  11/12/2018    Oral surgery    OTHER SURGICAL HISTORY  11/12/2018    Hand surgery   [3]   Family History  Problem Relation Name Age of Onset    Hyperlipidemia Mother      Hypertension Mother      Stroke Mother      Other (Fibromyalgia,hypertension, CVA) Mother      Heart block Mother      No Known Problems Father fam hx unk     Other (drug addiction) Brother     [4]   Social History  Tobacco Use    Smoking  status: Every Day     Current packs/day: 0.50     Types: Cigarettes    Smokeless tobacco: Never   Vaping Use    Vaping status: Never Used   Substance Use Topics    Alcohol use: Never    Drug use: Not Currently     Frequency: 1.0 times per week     Types: Marijuana     Comment: Used edibles once last week due to pain        Brendan Gray DO  07/23/25 2556